# Patient Record
Sex: FEMALE | Race: WHITE | URBAN - METROPOLITAN AREA
[De-identification: names, ages, dates, MRNs, and addresses within clinical notes are randomized per-mention and may not be internally consistent; named-entity substitution may affect disease eponyms.]

---

## 2017-09-28 ENCOUNTER — FOLLOW UP ESTABLISHED (OUTPATIENT)
Dept: URBAN - METROPOLITAN AREA CLINIC 30 | Facility: CLINIC | Age: 72
End: 2017-09-28
Payer: MEDICARE

## 2017-09-28 DIAGNOSIS — H20.041 SECONDARY NONINFECTIOUS IRIDOCYCLITIS, RIGHT EYE: Primary | ICD-10-CM

## 2017-09-28 PROCEDURE — 92014 COMPRE OPH EXAM EST PT 1/>: CPT | Performed by: OPTOMETRIST

## 2017-09-28 ASSESSMENT — INTRAOCULAR PRESSURE
OD: 14
OS: 14

## 2017-09-28 ASSESSMENT — KERATOMETRY
OD: 44.49
OS: 44.88

## 2017-09-28 ASSESSMENT — VISUAL ACUITY
OS: 20/20
OD: 20/20

## 2019-08-02 ENCOUNTER — FOLLOW UP ESTABLISHED (OUTPATIENT)
Dept: URBAN - METROPOLITAN AREA CLINIC 30 | Facility: CLINIC | Age: 74
End: 2019-08-02
Payer: MEDICARE

## 2019-08-02 DIAGNOSIS — H10.45 OTHER CHRONIC ALLERGIC CONJUNCTIVITIS: ICD-10-CM

## 2019-08-02 DIAGNOSIS — H26.493 OTHER SECONDARY CATARACT, BILATERAL: ICD-10-CM

## 2019-08-02 DIAGNOSIS — H57.12 OCULAR PAIN, LEFT EYE: Primary | ICD-10-CM

## 2019-08-02 PROCEDURE — 92014 COMPRE OPH EXAM EST PT 1/>: CPT | Performed by: OPTOMETRIST

## 2019-08-02 ASSESSMENT — INTRAOCULAR PRESSURE
OD: 15
OS: 14

## 2019-08-02 ASSESSMENT — VISUAL ACUITY
OD: 20/20
OS: 20/20

## 2019-08-02 ASSESSMENT — KERATOMETRY
OD: 44.44
OS: 44.73

## 2020-01-24 ENCOUNTER — FOLLOW UP ESTABLISHED (OUTPATIENT)
Dept: URBAN - METROPOLITAN AREA CLINIC 30 | Facility: CLINIC | Age: 75
End: 2020-01-24
Payer: MEDICARE

## 2020-01-24 PROCEDURE — 92134 CPTRZ OPH DX IMG PST SGM RTA: CPT | Performed by: OPTOMETRIST

## 2020-01-24 PROCEDURE — 92014 COMPRE OPH EXAM EST PT 1/>: CPT | Performed by: OPTOMETRIST

## 2020-01-24 ASSESSMENT — INTRAOCULAR PRESSURE
OS: 13
OD: 14

## 2020-01-24 ASSESSMENT — KERATOMETRY
OS: 44.73
OD: 44.41

## 2020-01-24 ASSESSMENT — VISUAL ACUITY
OS: 20/25
OD: 20/25

## 2021-03-02 ENCOUNTER — FOLLOW UP ESTABLISHED (OUTPATIENT)
Dept: URBAN - METROPOLITAN AREA CLINIC 30 | Facility: CLINIC | Age: 76
End: 2021-03-02
Payer: MEDICARE

## 2021-03-02 DIAGNOSIS — H43.393 OTHER VITREOUS OPACITIES, BILATERAL: ICD-10-CM

## 2021-03-02 PROCEDURE — 92134 CPTRZ OPH DX IMG PST SGM RTA: CPT | Performed by: OPTOMETRIST

## 2021-03-02 PROCEDURE — 92014 COMPRE OPH EXAM EST PT 1/>: CPT | Performed by: OPTOMETRIST

## 2021-03-02 ASSESSMENT — VISUAL ACUITY
OS: 20/25
OD: 20/20

## 2021-03-02 ASSESSMENT — KERATOMETRY
OS: 44.64
OD: 44.30

## 2021-03-02 ASSESSMENT — INTRAOCULAR PRESSURE
OS: 14
OD: 15

## 2022-03-04 ENCOUNTER — OFFICE VISIT (OUTPATIENT)
Dept: URBAN - METROPOLITAN AREA CLINIC 30 | Facility: CLINIC | Age: 77
End: 2022-03-04
Payer: MEDICARE

## 2022-03-04 PROCEDURE — 92134 CPTRZ OPH DX IMG PST SGM RTA: CPT | Performed by: OPTOMETRIST

## 2022-03-04 PROCEDURE — 92014 COMPRE OPH EXAM EST PT 1/>: CPT | Performed by: OPTOMETRIST

## 2022-03-04 ASSESSMENT — INTRAOCULAR PRESSURE
OD: 14
OS: 11

## 2022-03-04 ASSESSMENT — VISUAL ACUITY
OD: 20/20
OS: 20/30

## 2022-03-04 ASSESSMENT — KERATOMETRY
OD: 44.49
OS: 44.85

## 2022-03-04 NOTE — IMPRESSION/PLAN
Impression: Other vitreous opacities, bilateral Plan: Retina exam appears stable. Signs and symptoms of RD reviewed. RTC STAT if any increased in floaters or loss of VA. Mac OCT today- stable.

## 2022-03-04 NOTE — IMPRESSION/PLAN
Impression: Secondary noninfectious iridocyclitis, right eye Plan: Stable. Remains quiescent. Originally caused by adverse reaction to Reclast.  (administered IV for osteoporosis). Monitor for recurrence. Currently on Prolia injections q6mos subcutaneous.

## 2022-03-04 NOTE — IMPRESSION/PLAN
Impression: Other chronic allergic conjunctivitis ; OU Plan: Pt notes periorbital tenderness at times along w significant of itchiness. Continue At's QID OU. Rediscussed OTC Zaditor or Pataday PRN OU. Avoid rubbing. +cool compress.

## 2023-03-06 ENCOUNTER — OFFICE VISIT (OUTPATIENT)
Dept: URBAN - METROPOLITAN AREA CLINIC 30 | Facility: CLINIC | Age: 78
End: 2023-03-06
Payer: MEDICARE

## 2023-03-06 DIAGNOSIS — H26.493 OTHER SECONDARY CATARACT, BILATERAL: Primary | ICD-10-CM

## 2023-03-06 DIAGNOSIS — H10.45 OTHER CHRONIC ALLERGIC CONJUNCTIVITIS: ICD-10-CM

## 2023-03-06 DIAGNOSIS — H43.393 OTHER VITREOUS OPACITIES, BILATERAL: ICD-10-CM

## 2023-03-06 DIAGNOSIS — H20.041 SECONDARY NONINFECTIOUS IRIDOCYCLITIS, RIGHT EYE: ICD-10-CM

## 2023-03-06 PROCEDURE — 99213 OFFICE O/P EST LOW 20 MIN: CPT | Performed by: OPTOMETRIST

## 2023-03-06 PROCEDURE — 92134 CPTRZ OPH DX IMG PST SGM RTA: CPT | Performed by: OPTOMETRIST

## 2023-03-06 ASSESSMENT — INTRAOCULAR PRESSURE
OD: 14
OS: 14

## 2023-03-06 ASSESSMENT — VISUAL ACUITY
OD: 20/20
OS: 20/20

## 2023-03-06 ASSESSMENT — KERATOMETRY
OS: 44.39
OD: 44.41

## 2023-03-06 NOTE — IMPRESSION/PLAN
Impression: Other secondary cataract, bilateral Plan: Remains stable and mild. Opacified capsule not affecting vision. Continue to monitor.

## 2023-03-06 NOTE — IMPRESSION/PLAN
Impression: Other vitreous opacities, bilateral Plan: Retina exam appears stable. Signs and symptoms of RD reviewed. RTC STAT if any increased in floaters or loss of VA. See Mac OCT today- stable.

## 2023-03-06 NOTE — IMPRESSION/PLAN
Impression: Secondary noninfectious iridocyclitis, right eye Plan: Stable. Again, remains quiescent. Originally caused by adverse reaction to Reclast.  (administered IV for osteoporosis). Monitor for recurrence. Currently on Prolia injections q6mos subcutaneous.

## 2023-03-06 NOTE — IMPRESSION/PLAN
Impression: Other chronic allergic conjunctivitis ; OU Plan: Resolved. Pt previously noted periorbital tenderness at times along w significant of itchiness. Continue At's QID OU. Rediscussed OTC Zaditor or Pataday PRN OU. Avoid rubbing. +cool compress.

## 2024-03-04 ENCOUNTER — OFFICE VISIT (OUTPATIENT)
Dept: URBAN - METROPOLITAN AREA CLINIC 30 | Facility: CLINIC | Age: 79
End: 2024-03-04
Payer: MEDICARE

## 2024-03-04 DIAGNOSIS — H26.493 OTHER SECONDARY CATARACT, BILATERAL: ICD-10-CM

## 2024-03-04 DIAGNOSIS — H20.041 SECONDARY NONINFECTIOUS IRIDOCYCLITIS, RIGHT EYE: ICD-10-CM

## 2024-03-04 DIAGNOSIS — H10.45 OTHER CHRONIC ALLERGIC CONJUNCTIVITIS: ICD-10-CM

## 2024-03-04 DIAGNOSIS — H04.123 TEAR FILM INSUFFICIENCY OF BILATERAL LACRIMAL GLANDS: Primary | ICD-10-CM

## 2024-03-04 DIAGNOSIS — H43.393 OTHER VITREOUS OPACITIES, BILATERAL: ICD-10-CM

## 2024-03-04 PROCEDURE — 83861 MICROFLUID ANALY TEARS: CPT | Performed by: OPTOMETRIST

## 2024-03-04 PROCEDURE — 99213 OFFICE O/P EST LOW 20 MIN: CPT | Performed by: OPTOMETRIST

## 2024-03-04 ASSESSMENT — INTRAOCULAR PRESSURE
OS: 17
OD: 19

## 2024-03-04 ASSESSMENT — VISUAL ACUITY
OS: 20/20
OD: 20/20

## 2024-03-04 ASSESSMENT — KERATOMETRY
OD: 44.52
OS: 45.06

## 2025-03-03 ENCOUNTER — OFFICE VISIT (OUTPATIENT)
Dept: URBAN - METROPOLITAN AREA CLINIC 30 | Facility: CLINIC | Age: 80
End: 2025-03-03
Payer: MEDICARE

## 2025-03-03 DIAGNOSIS — H43.393 OTHER VITREOUS OPACITIES, BILATERAL: ICD-10-CM

## 2025-03-03 DIAGNOSIS — H26.493 OTHER SECONDARY CATARACT, BILATERAL: ICD-10-CM

## 2025-03-03 DIAGNOSIS — H04.123 TEAR FILM INSUFFICIENCY OF BILATERAL LACRIMAL GLANDS: ICD-10-CM

## 2025-03-03 DIAGNOSIS — H20.041 SECONDARY NONINFECTIOUS IRIDOCYCLITIS, RIGHT EYE: ICD-10-CM

## 2025-03-03 DIAGNOSIS — H10.45 OTHER CHRONIC ALLERGIC CONJUNCTIVITIS: Primary | ICD-10-CM

## 2025-03-03 ASSESSMENT — KERATOMETRY
OS: 44.88
OD: 44.50

## 2025-03-03 ASSESSMENT — INTRAOCULAR PRESSURE
OS: 17
OD: 18

## 2025-03-03 ASSESSMENT — VISUAL ACUITY
OD: 20/20
OS: 20/25